# Patient Record
(demographics unavailable — no encounter records)

---

## 2018-10-19 NOTE — PHYS DOC
Past History


Past Medical History:  Asthma, Other


Past Surgical History:  No Surgical History


Smoking:  Second-hand


Alcohol Use:  None


Drug Use:  None





General Pediatric Assessment


Chief Complaint


Cough and abdominal pain


History of Present Illness





Patient is a 7 year old male with history of asthma brought in by his mother 

because of cough and congestion for 1 week and complaining of p.m. because of 

abdominal pain for the last 2 hours as a constant pain without radiation. 

Patient did not have vomiting, diarrhea, urinary symptom. Patient had 

intermittent episodes of fever up to 101. [facility] and 

because of the abdominal pain sent to ER for evaluation. Patient is up-to-date 

with his immunization.


Review of Systems





Constitutional: Denies fever or chills []


Eyes: Denies change in visual acuity, redness, or eye pain []


HENT: Denies nasal congestion or sore throat []


Respiratory: Reports cough and shortness of breath


Cardiovascular: No additional information not addressed in HPI []


GI: Reports abdominal pain, denies nausea, vomiting, bloody stools or diarrhea [

]


: Denies dysuria or hematuria []


Musculoskeletal: Denies back pain or joint pain []


Integument: Denies rash or skin lesions []


Neurologic: Denies headache, focal weakness or sensory changes []


Endocrine: Denies polyuria or polydipsia []





All other systems were reviewed and found to be within normal limits, except as 

documented in this note.


Current Medications





Current Medications








 Medications


  (Trade)  Dose


 Ordered  Sig/Alia  Start Time


 Stop Time Status Last Admin


Dose Admin


 


 Albuterol Sulfate


  (Ventolin)  2.5 mg  STK-MED ONCE  10/19/18 15:35


 10/19/18 15:36 DC  











Allergies





Allergies








Coded Allergies Type Severity Reaction Last Updated Verified


 


  amoxicillin Allergy Unknown rash 3/31/14 Yes








Physical Exam





Constitutional: Well developed, well nourished, no acute distress, non-toxic 

appearance, positive interaction, playful.


HENT: Normocephalic, atraumatic, bilateral external ears normal, oropharynx 

moist, no oral exudates, nose normal.


Eyes: PERLL, EOMI, conjunctiva normal, no discharge.


Neck: Normal range of motion, no tenderness, supple, no stridor.


Cardiovascular: Normal heart rate, normal rhythm, no murmurs, no rubs, no 

gallops.


Thorax and Lungs: Normal breath sounds, no respiratory distress, no wheezing, 

no chest tenderness, no retractions, no accessory muscle use.


Abdomen: Bowel sounds normal, soft, no tenderness, no masses, no pulsatile 

masses. No sign of appendicitis or abdominal tenderness


Skin: Warm, dry, no erythema, no rash.


Back: No tenderness, no CVA tenderness.


Extremeties: Intact distal pulses, no tenderness, no cyanosis, no clubbing, ROM 

intact, no edema. 


Musculoskeletal: Good ROM in all major joints, no tenderness to palpation or 

major deformities noted. 


Neurologic: Alert and oriented appropriate for age


Radiology/Procedures


[]


Current Patient Data





Active Scripts








 Medications  Dose


 Route/Sig


 Max Daily Dose Days Date Category


 


 [tylenol]    


 


   3/12/14 Reported


 


 Gary Pain


 Reliever


  (Acetaminophen)


 160 Mg Tab.rapdis  


 


   3/12/14 Reported


 


 Pedialyte


 Electrolyte


 Singles


  (Electrolyte,Oral)


 237 Ml Solution  


 


   3/12/14 Reported








Vital Signs








  Date Time  Temp Pulse Resp B/P (MAP) Pulse Ox O2 Delivery O2 Flow Rate FiO2


 


10/19/18 15:23 98.6    97   


 


10/19/18 15:41      Room Air  








Vital Signs








  Date Time  Temp Pulse Resp B/P (MAP) Pulse Ox O2 Delivery O2 Flow Rate FiO2


 


10/19/18 15:41     97 Room Air  


 


10/19/18 15:23 98.6    97   








Vital Signs








  Date Time  Temp Pulse Resp B/P (MAP) Pulse Ox O2 Delivery O2 Flow Rate FiO2


 


10/19/18 15:41     97 Room Air  


 


10/19/18 15:23 98.6       








Course & Med Decision Making


discharge:





I've spoken with the patient and/or caregivers. I've explained the patient's 

condition, diagnosis and treatment plan based on information available to me at 

this time. I've answered the patient's and/or caregivers questions and 

addressed any concerns. The patient and/or caregivers have a good understanding 

the patient's diagnosis, condition and treatment plan as can be expected at 

this point. Vital signs have been stabilized. The patient's condition is stable 

for discharge from the emergency department.





The patient will pursue further outpatient evaluation with her primary care 

provider or other designated consulting physician as outlined in the discharge 

instructions. Patient and/or caregivers are agreeable to this plan of care and 

follow-up instructions have been explained in detail. The patient and/or 

caregivers have received these instructions in written format and expressed 

understanding of these discharge instructions. The patient and her caregivers 

are aware that if any significant change in condition or worsening of symptoms 

should prompt him to immediately return to this of the closest emergency 

department.  If an emergent department is not readily available I would 

encourage him to call [phone].





Departure


Departure:


Impression:  


 Primary Impression:  


 Asthma exacerbation


Disposition:  01 HOME, SELF-CARE (at 1602)


Condition:  STABLE


Referrals:  


DHIRAJ KELLER MD (PCP)


Patient Instructions:  Abdominal Pain, Child, Asthma Attacks, Prevention, Asthma

, Child, Cough, Child





Additional Instructions:  


Drink plenty of liquids


Follow-up with your primary care physician in 3-5 days


Return to ER if not getting better


May have over-the-counter Tylenol and ibuprofen as needed for pain


Continue home medication


May take Benadryl as needed for cough











RYDER UPTON MD Oct 19, 2018 16:03